# Patient Record
Sex: MALE | Race: WHITE | URBAN - METROPOLITAN AREA
[De-identification: names, ages, dates, MRNs, and addresses within clinical notes are randomized per-mention and may not be internally consistent; named-entity substitution may affect disease eponyms.]

---
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
## 2022-04-10 ENCOUNTER — HISTORICAL (OUTPATIENT)
Dept: ADMINISTRATIVE | Facility: HOSPITAL | Age: 22
End: 2022-04-10

## 2022-04-28 VITALS
BODY MASS INDEX: 19.96 KG/M2 | DIASTOLIC BLOOD PRESSURE: 73 MMHG | OXYGEN SATURATION: 97 % | SYSTOLIC BLOOD PRESSURE: 128 MMHG | WEIGHT: 116.88 LBS | HEIGHT: 64 IN

## 2022-05-04 NOTE — HISTORICAL [facility] CERNER
This is a historical note converted from Ernesto. Formatting and pictures may have been removed.  Please reference Ernesto for original formatting and attached multimedia. Chief Complaint  left wrist pain after falling and bending it back  History of Present Illness  18-year-old male presents for concern of pain to the left wrist that began after a fall?onto the outstretched hand.?Mild abrasion to the left?knee otherwise no?other pain reported.?Occasional pulling discomfort to the fingers with range of motion of the?left wrist. No history of?fractures. Patient does smoke.  Review of Systems  Constitutional_negative for fever  ENMT_negative for?blurry vision or change in vision  Gastrointestinal_negative for nausea or vomiting  Integumentary_negative for rash  Physical Exam  Vitals & Measurements  T:?37? ?C (Oral)? HR:?84(Peripheral)? RR:?17? BP:?128/73? SpO2:?97%?  HT:?162?cm? WT:?53?kg? BMI:?20.2?  Gen: WD NAD  CV: S1S2 RRR no MRG  Resp: CTA B  Integument: small abrasion to the L anterior knee  MS: Full range of motion of the left wrist with pulling sensation, positive tenderness to palpation over the medial lateral and central portion of the wrist, no pain with range of motion or palpation of the hand,  intact, radial pulse 2+ bilateral upper extremities, no gross deformity, sensation intact of the left hand  Psych: Cooperative, approp mood and affect  Assessment/Plan  1.?Tobacco user?Z72.0  ? recommend cessation  Ordered:  Office/Outpatient Visit Level 3 New [id number] PC, Tobacco user  Left wrist pain  Fall, [facility], 05/30/19 18:00:00 CDT  ?  2.?Left wrist pain?M25.532  X-ray of the left wrist done today, per my review negative for fracture.  Will also call with final report.?  Ice, elevate, wrap,?Aleve for discomfort.  Ordered:  Office/Outpatient Visit Level 3 New [id number] PC, Tobacco user  Left wrist pain  Fall, [facility], 05/30/19 18:00:00 CDT  XR Wrist Left Minimum 3 Views, Routine, 05/30/19 18:00:00 CDT, Fall,  None, Ambulatory, Rad Type, Left wrist pain  Fall, Not Scheduled, 05/30/19 18:00:00 CDT  ?  3.?Fall?W19.XXXA  Ordered:  Office/Outpatient Visit Level 3 New [id number] PC, Tobacco user  Left wrist pain  Fall, [facility], 05/30/19 18:00:00 CDT  XR Wrist Left Minimum 3 Views, Routine, 05/30/19 18:00:00 CDT, Fall, None, Ambulatory, Rad Type, Left wrist pain  Fall, Not Scheduled, 05/30/19 18:00:00 CDT  ?   Problem List/Past Medical History  Ongoing  No qualifying data  Historical  No qualifying data  Procedure/Surgical History  none   Medications  No active medications  Allergies  No Known Medication Allergies  Social History  Alcohol  Never, 05/30/2019  Substance Abuse  Never, 05/30/2019  Tobacco  Smoker, current status unknown, Electronic Device, N/A, 05/30/2019  Family History  Congestive heart disease.: Grandfather.  Health Maintenance  Health Maintenance  ???Pending?(in the next year)  ??? ??OverDue  ??? ? ? ?Alcohol Misuse Screening due??01/01/19??and every 1??year(s)  ??? ? ? ?Smoking Cessation due??01/01/19??Variable frequency  ??? ??Due?  ??? ? ? ?ADL Screening due??05/30/19??and every 1??year(s)  ??? ??Due In Future?  ??? ? ? ?Obesity Screening not due until??01/01/20??and every 1??year(s)  ???Satisfied?(in the past 1 year)  ??? ??Satisfied?  ??? ? ? ?Blood Pressure Screening on??05/30/19.??Satisfied by Corbin Ruiz A.  ??? ? ? ?Body Mass Index Check on??05/30/19.??Satisfied by SYSTEM  ??? ? ? ?Obesity Screening on??05/30/19.??Satisfied by James Ruizis A.  ?  ?